# Patient Record
Sex: FEMALE | Race: WHITE | Employment: OTHER | URBAN - METROPOLITAN AREA
[De-identification: names, ages, dates, MRNs, and addresses within clinical notes are randomized per-mention and may not be internally consistent; named-entity substitution may affect disease eponyms.]

---

## 2019-01-17 ENCOUNTER — HOSPITAL ENCOUNTER (OUTPATIENT)
Facility: CLINIC | Age: 84
Setting detail: OBSERVATION
Discharge: HOME | End: 2019-01-19
Attending: NURSE PRACTITIONER | Admitting: INTERNAL MEDICINE
Payer: MEDICARE

## 2019-01-17 DIAGNOSIS — K92.2 GASTROINTESTINAL HEMORRHAGE, UNSPECIFIED GASTROINTESTINAL HEMORRHAGE TYPE: ICD-10-CM

## 2019-01-17 DIAGNOSIS — R55 SYNCOPE: ICD-10-CM

## 2019-01-17 LAB
ABO + RH BLD: NORMAL
ABO + RH BLD: NORMAL
ALBUMIN SERPL-MCNC: 3.2 G/DL (ref 3.4–5)
ALP SERPL-CCNC: 80 U/L (ref 40–150)
ALT SERPL W P-5'-P-CCNC: 18 U/L (ref 0–50)
ANION GAP SERPL CALCULATED.3IONS-SCNC: 9 MMOL/L (ref 3–14)
AST SERPL W P-5'-P-CCNC: 26 U/L (ref 0–45)
BILIRUB SERPL-MCNC: 0.8 MG/DL (ref 0.2–1.3)
BLD GP AB SCN SERPL QL: NORMAL
BLOOD BANK CMNT PATIENT-IMP: NORMAL
BUN SERPL-MCNC: 18 MG/DL (ref 7–30)
CALCIUM SERPL-MCNC: 9.1 MG/DL (ref 8.5–10.1)
CHLORIDE SERPL-SCNC: 105 MMOL/L (ref 94–109)
CO2 SERPL-SCNC: 27 MMOL/L (ref 20–32)
CREAT SERPL-MCNC: 1.1 MG/DL (ref 0.52–1.04)
ERYTHROCYTE [DISTWIDTH] IN BLOOD BY AUTOMATED COUNT: 12.7 % (ref 10–15)
GFR SERPL CREATININE-BSD FRML MDRD: 46 ML/MIN/{1.73_M2}
GLUCOSE SERPL-MCNC: 205 MG/DL (ref 70–99)
HCT VFR BLD AUTO: 40.8 % (ref 35–47)
HEMOCCULT STL QL: POSITIVE
HGB BLD-MCNC: 13.3 G/DL (ref 11.7–15.7)
INTERPRETATION ECG - MUSE: NORMAL
MCH RBC QN AUTO: 32.9 PG (ref 26.5–33)
MCHC RBC AUTO-ENTMCNC: 32.6 G/DL (ref 31.5–36.5)
MCV RBC AUTO: 101 FL (ref 78–100)
PLATELET # BLD AUTO: 316 10E9/L (ref 150–450)
POTASSIUM SERPL-SCNC: 3.4 MMOL/L (ref 3.4–5.3)
PROT SERPL-MCNC: 6.8 G/DL (ref 6.8–8.8)
RBC # BLD AUTO: 4.04 10E12/L (ref 3.8–5.2)
SODIUM SERPL-SCNC: 141 MMOL/L (ref 133–144)
SPECIMEN EXP DATE BLD: NORMAL
TROPONIN I SERPL-MCNC: <0.015 UG/L (ref 0–0.04)
WBC # BLD AUTO: 8.8 10E9/L (ref 4–11)

## 2019-01-17 PROCEDURE — 84484 ASSAY OF TROPONIN QUANT: CPT | Performed by: NURSE PRACTITIONER

## 2019-01-17 PROCEDURE — 36415 COLL VENOUS BLD VENIPUNCTURE: CPT | Performed by: INTERNAL MEDICINE

## 2019-01-17 PROCEDURE — 99285 EMERGENCY DEPT VISIT HI MDM: CPT | Mod: 25

## 2019-01-17 PROCEDURE — 84484 ASSAY OF TROPONIN QUANT: CPT | Mod: 91 | Performed by: INTERNAL MEDICINE

## 2019-01-17 PROCEDURE — 25000128 H RX IP 250 OP 636: Performed by: NURSE PRACTITIONER

## 2019-01-17 PROCEDURE — 85027 COMPLETE CBC AUTOMATED: CPT | Performed by: NURSE PRACTITIONER

## 2019-01-17 PROCEDURE — G0378 HOSPITAL OBSERVATION PER HR: HCPCS

## 2019-01-17 PROCEDURE — 25000128 H RX IP 250 OP 636: Performed by: INTERNAL MEDICINE

## 2019-01-17 PROCEDURE — 93005 ELECTROCARDIOGRAM TRACING: CPT

## 2019-01-17 PROCEDURE — 96361 HYDRATE IV INFUSION ADD-ON: CPT

## 2019-01-17 PROCEDURE — 82272 OCCULT BLD FECES 1-3 TESTS: CPT | Performed by: NURSE PRACTITIONER

## 2019-01-17 PROCEDURE — 80053 COMPREHEN METABOLIC PANEL: CPT | Performed by: NURSE PRACTITIONER

## 2019-01-17 PROCEDURE — 96360 HYDRATION IV INFUSION INIT: CPT

## 2019-01-17 PROCEDURE — 86900 BLOOD TYPING SEROLOGIC ABO: CPT | Performed by: NURSE PRACTITIONER

## 2019-01-17 PROCEDURE — 99219 ZZC INITIAL OBSERVATION CARE,LEVL II: CPT | Performed by: INTERNAL MEDICINE

## 2019-01-17 PROCEDURE — 86901 BLOOD TYPING SEROLOGIC RH(D): CPT | Performed by: NURSE PRACTITIONER

## 2019-01-17 PROCEDURE — 86850 RBC ANTIBODY SCREEN: CPT | Performed by: NURSE PRACTITIONER

## 2019-01-17 RX ORDER — ONDANSETRON 4 MG/1
4 TABLET, ORALLY DISINTEGRATING ORAL EVERY 6 HOURS PRN
Status: DISCONTINUED | OUTPATIENT
Start: 2019-01-17 | End: 2019-01-19 | Stop reason: HOSPADM

## 2019-01-17 RX ORDER — ACETAMINOPHEN 325 MG/1
650 TABLET ORAL EVERY 4 HOURS PRN
Status: DISCONTINUED | OUTPATIENT
Start: 2019-01-17 | End: 2019-01-18 | Stop reason: ALTCHOICE

## 2019-01-17 RX ORDER — SODIUM CHLORIDE 9 MG/ML
INJECTION, SOLUTION INTRAVENOUS CONTINUOUS
Status: DISCONTINUED | OUTPATIENT
Start: 2019-01-17 | End: 2019-01-19

## 2019-01-17 RX ORDER — NALOXONE HYDROCHLORIDE 0.4 MG/ML
.1-.4 INJECTION, SOLUTION INTRAMUSCULAR; INTRAVENOUS; SUBCUTANEOUS
Status: DISCONTINUED | OUTPATIENT
Start: 2019-01-17 | End: 2019-01-19 | Stop reason: HOSPADM

## 2019-01-17 RX ORDER — ONDANSETRON 2 MG/ML
4 INJECTION INTRAMUSCULAR; INTRAVENOUS EVERY 6 HOURS PRN
Status: DISCONTINUED | OUTPATIENT
Start: 2019-01-17 | End: 2019-01-19 | Stop reason: HOSPADM

## 2019-01-17 RX ORDER — ACETAMINOPHEN 650 MG/1
650 SUPPOSITORY RECTAL EVERY 4 HOURS PRN
Status: DISCONTINUED | OUTPATIENT
Start: 2019-01-17 | End: 2019-01-19 | Stop reason: HOSPADM

## 2019-01-17 RX ADMIN — SODIUM CHLORIDE 1000 ML: 9 INJECTION, SOLUTION INTRAVENOUS at 15:31

## 2019-01-17 RX ADMIN — SODIUM CHLORIDE: 9 INJECTION, SOLUTION INTRAVENOUS at 19:46

## 2019-01-17 SDOH — HEALTH STABILITY: MENTAL HEALTH: HOW OFTEN DO YOU HAVE A DRINK CONTAINING ALCOHOL?: NEVER

## 2019-01-17 ASSESSMENT — ENCOUNTER SYMPTOMS
LIGHT-HEADEDNESS: 0
ABDOMINAL PAIN: 0
FEVER: 0
CHILLS: 0
ROS GI COMMENTS: SOFT STOOLS
DYSURIA: 0
SHORTNESS OF BREATH: 0
DIZZINESS: 0

## 2019-01-17 NOTE — ED NOTES
[facility]  ED Nurse Handoff Report    Liseth Daily is a 83 year old female   ED Chief complaint: Loss of Consciousness  . ED Diagnosis:   Final diagnoses:   Syncope   Gastrointestinal hemorrhage, unspecified gastrointestinal hemorrhage type     Allergies:   Allergies   Allergen Reactions     Accupril      Aspirin      Penicillin G        Code Status: Full Code  Activity level - Baseline/Home:  Independent. Activity Level - Current:   Stand with Assist of 2. Lift room needed: No. Bariatric: No   Needed: No   Isolation: No. Infection: Not Applicable.     Vital Signs:   Vitals:    01/17/19 1430 01/17/19 1445 01/17/19 1500 01/17/19 1515   BP: 114/61 107/58 97/56 104/53   Pulse: 60  63 65   Resp: 17 (!) 41 17 20   Temp:       TempSrc:       SpO2: 97%  100% 99%       Cardiac Rhythm:  ,      Pain level:    Patient confused: No. Patient Falls Risk: Yes.   Elimination Status: Has voided   Patient Report - Initial Complaint: Syncope. Focused Assessment:   14:28 Neurological Cognitive - Level Of Consciousness: alert Arousal Level: opens eyes spontaneously Orientation: oriented x 4 Follows Commands: yes Speech: clear; spontaneous (slow to answer occasionally but states this is her baseline d/t prior CVAs) Mood/Behavior: cooperative  Pupils (CN II) - Pupil PERRLA: yes  Motor Strength - Left Upper: 5 - active movement against gravity and full resistance Right Upper: 5 - active movement against gravity and full resistance Left Lower: 5 - active movement against gravity and full resistance Right Lower: 5 - active movement against gravity and full resistance ER      14:27 Gastrointestinal Gastrointestinal - Nausea/Vomiting Signs/Symptoms: nausea intermittent Stool Color: red streaked Gastrointestinal Comment: incontinent of stool PTA, Had BM in [facility] whicg was red streaked. Foul smelling ER     14:27 Skin Color/Condition Skin - Skin Color/Characteristics: pale ER        Tests Performed: Labs. Abnormal  Results:   Labs Ordered and Resulted from Time of ED Arrival Up to the Time of Departure from the ED   CBC WITH PLATELETS - Abnormal; Notable for the following components:       Result Value     (*)     All other components within normal limits   COMPREHENSIVE METABOLIC PANEL - Abnormal; Notable for the following components:    Glucose 205 (*)     Creatinine 1.10 (*)     GFR Estimate 46 (*)     GFR Estimate If Black 54 (*)     Albumin 3.2 (*)     All other components within normal limits   OCCULT BLOOD STOOL - Abnormal; Notable for the following components:    Occult Blood Positive (*)     All other components within normal limits   TROPONIN I   MAY SALINE LOCK IV   ABO/RH TYPE AND SCREEN   .   Treatments provided: IVF  Family Comments: son present  OBS brochure/video discussed/provided to patient:  No  ED Medications:   Medications   0.9% sodium chloride BOLUS (1,000 mLs Intravenous New Bag 1/17/19 0393)     Drips infusing:  No  For the majority of the shift, the patient's behavior Green. Interventions performed were NA.     Severe Sepsis OR Septic Shock Diagnosis Present: No      ED Nurse Name/Phone Number: Lilo Bethea,   4:14 PM    RECEIVING UNIT ED HANDOFF REVIEW    Above ED Nurse Handoff Report was reviewed: Yes  Reviewed by: Gloria Ragsdale on January 17, 2019 at 5:51 PM

## 2019-01-17 NOTE — ED NOTES
Pt guest arrived and stated he never received PT's drivers license from EMS.  Called and left voicemail with [facility]

## 2019-01-17 NOTE — ED NOTES
Observation Brochure and Video   Patient informed of observation status based on provider's order. Observation Brochure was given and video watched.  Lilo Bethea RN

## 2019-01-17 NOTE — ED NOTES
Bed: ED20  Expected date: 1/17/19  Expected time: 1:44 PM  Means of arrival: Ambulance  Comments:  [facility]

## 2019-01-17 NOTE — ED PROVIDER NOTES
History     Chief Complaint:  Loss of Consciousness      HPI   Liseth Daily is a 83 year old female with a past medical history significant for GI bleed and cerebral infarction who presents with for evaluation of loss of consciousness. The patient reports that she has had 8 syncopal episodes in the last 5 years. Her most recent episode occurred while she was going to the bathroom several weeks ago and passed out. She denies abdominal pain, lightheadedness, dizziness, blood thinners. She has noticed that her stool has been darker than normal.        Allergies:  Accupril  Aspirin  Penicillin G     Medications:    The patient is not currently taking any prescribed medications.    Past Medical History:    Cerebral infarct  GI bleed    Past Surgical History:    Cholecystectomy  Orthopedic surgery     Family History:    History reviewed. No pertinent family history.     Social History:  Smoking status: Never smoker  Alcohol use: no  Marital Status:  Single       Review of Systems   Constitutional: Negative for chills and fever.   Respiratory: Negative for shortness of breath.    Cardiovascular: Negative for chest pain.   Gastrointestinal: Negative for abdominal pain.        Soft stools   Genitourinary: Negative for dysuria.   Neurological: Positive for syncope. Negative for dizziness and light-headedness.   All other systems reviewed and are negative.      Physical Exam     Patient Vitals for the past 24 hrs:   BP Temp Temp src Pulse Heart Rate Resp SpO2   01/17/19 1645 119/64 -- -- 71 71 12 93 %   01/17/19 1630 112/62 -- -- 68 66 29 92 %   01/17/19 1615 103/49 -- -- 63 66 19 94 %   01/17/19 1600 113/64 -- -- 67 68 (!) 33 92 %   01/17/19 1530 102/74 -- -- 61 64 20 94 %   01/17/19 1515 104/53 -- -- 65 64 20 99 %   01/17/19 1500 97/56 -- -- 63 66 17 100 %   01/17/19 1445 107/58 -- -- -- 70 (!) 41 --   01/17/19 1430 114/61 -- -- 60 61 17 97 %   01/17/19 1415 107/61 -- -- 56 61 13 96 %   01/17/19 1408 117/68 97.7  F  (36.5  C) Oral -- 60 18 98 %         Physical Exam  General: Alert, Mild  discomfort, well kept  Eyes: PERRL, conjunctivae pink no scleral icterus or conjunctival injection  ENT:   Moist mucus membranes, posterior oropharynx clear without erythema or exudates, No lymphadenopathy, Normal voice  Resp:  Lungs clear to auscultation bilaterally, no crackles/rubs/wheezes. Good air movement  CV:  Normal rate and rhythm, no murmurs/rubs/gallops  GI:  Abdomen soft and non-distended.  Hyperactive bowel sounds.  No tenderness, guarding or rebound, No masses  Skin:  Warm, dry.  No rashes or petechiae  Musculoskeletal: No peripheral edema or calf tenderness, Normal gross ROM   Neuro: Alert and oriented to person/place/time, normal sensation  Psychiatric: Normal affect, cooperative, good eye contact  Rectal: Normal tone, no hemorrhoids, soft light brown stool in vault, reevaluation shows red stool     Emergency Department Course   ECG (15:06:51):  Rate 64 bpm. IL interval *. QRS duration 70. QT/QTc 416/429. P-R-T axes * 15 -16. Junctional rhythm, Nonspecific T wave abnormality, Abnormal ECG,  Interpreted at 1506 by Ziggy Darling APRN.      Laboratory:  Stool: Occult blood positive  ABO/Rh type screen: O positive  Troponin I: <0.015  CMP: Glucose 205, Creatinine 1.10, GFR 46, Albumin 3.2  CBC: WNL (WBC 8.8, HGB 13.3, )    Interventions:  1531: NaCl BOLUS 1000 ml IV    Emergency Department Course:  Past medical records, nursing notes, and vitals reviewed.  1417: I performed an exam of the patient and obtained history, as documented above.     IV inserted and blood drawn.    Findings and plan explained to the Patient who consents to admission.     1623: Discussed the patient with Dr. Ferrara, who will admit the patient to a med bed for further monitoring, evaluation, and treatment.       Impression & Plan      Medical Decision Making:  Liseth Daily is a 83 year old female who presents today for evaluation of  syncopal episode.  She was sitting at lunch when she had a syncopal episode at that time she did also lose control of her bowels.  She reports loose stools and that she has had multiple vasovagal episodes particularly with straining at the restroom over the last couple of years.  She denies any recent blood in her stool however does report GI bleed several years ago.  Her evaluation today shows a normal hemoglobin and stable vital signs.  She did have 3 stools in the department.  The third stool did show blood and was in fact positive with the Hemoccult.  At this point in time due to syncope plan will be to put patient in observation unit trend hemoglobin and consult GI.  I spoke to the hospitalist who will accept patient to his service.      Diagnosis:    ICD-10-CM    1. Syncope R55    2. Gastrointestinal hemorrhage, unspecified gastrointestinal hemorrhage type K92.2        Disposition:  Admitted to Med bed      Zack Durand  1/17/2019   [facility] EMERGENCY DEPARTMENT    Scribe Disclosure:  I, Zack Durand, am serving as a scribe at 2:17 PM on 1/17/2019 to document services personally performed by Ziggy Darling APRN based on my observations and the provider's statements to me.          Ziggy Darling APRN CNP  01/17/19 3615

## 2019-01-17 NOTE — ED TRIAGE NOTES
Patient brought in by EMS for eval of syncopal episode while at lunch. Patient was incontinent of bowel with episode.

## 2019-01-18 LAB
ERYTHROCYTE [DISTWIDTH] IN BLOOD BY AUTOMATED COUNT: 12.7 % (ref 10–15)
GLUCOSE BLDC GLUCOMTR-MCNC: 106 MG/DL (ref 70–99)
GLUCOSE BLDC GLUCOMTR-MCNC: 95 MG/DL (ref 70–99)
HCT VFR BLD AUTO: 36.7 % (ref 35–47)
HGB BLD-MCNC: 12.1 G/DL (ref 11.7–15.7)
HGB BLD-MCNC: 12.5 G/DL (ref 11.7–15.7)
MCH RBC QN AUTO: 33.2 PG (ref 26.5–33)
MCHC RBC AUTO-ENTMCNC: 33 G/DL (ref 31.5–36.5)
MCV RBC AUTO: 101 FL (ref 78–100)
PLATELET # BLD AUTO: 225 10E9/L (ref 150–450)
RBC # BLD AUTO: 3.64 10E12/L (ref 3.8–5.2)
TROPONIN I SERPL-MCNC: <0.015 UG/L (ref 0–0.04)
TROPONIN I SERPL-MCNC: <0.015 UG/L (ref 0–0.04)
WBC # BLD AUTO: 12.7 10E9/L (ref 4–11)

## 2019-01-18 PROCEDURE — 00000146 ZZHCL STATISTIC GLUCOSE BY METER IP

## 2019-01-18 PROCEDURE — 99225 ZZC SUBSEQUENT OBSERVATION CARE,LEVEL II: CPT | Performed by: INTERNAL MEDICINE

## 2019-01-18 PROCEDURE — 85018 HEMOGLOBIN: CPT | Mod: 91 | Performed by: INTERNAL MEDICINE

## 2019-01-18 PROCEDURE — 36415 COLL VENOUS BLD VENIPUNCTURE: CPT | Performed by: INTERNAL MEDICINE

## 2019-01-18 PROCEDURE — 99207 ZZC CDG-CODE CATEGORY CHANGED: CPT | Performed by: INTERNAL MEDICINE

## 2019-01-18 PROCEDURE — A9270 NON-COVERED ITEM OR SERVICE: HCPCS | Mod: GY | Performed by: INTERNAL MEDICINE

## 2019-01-18 PROCEDURE — 25000128 H RX IP 250 OP 636: Performed by: INTERNAL MEDICINE

## 2019-01-18 PROCEDURE — 96361 HYDRATE IV INFUSION ADD-ON: CPT

## 2019-01-18 PROCEDURE — G0378 HOSPITAL OBSERVATION PER HR: HCPCS

## 2019-01-18 PROCEDURE — 84484 ASSAY OF TROPONIN QUANT: CPT | Performed by: INTERNAL MEDICINE

## 2019-01-18 PROCEDURE — 85027 COMPLETE CBC AUTOMATED: CPT | Performed by: INTERNAL MEDICINE

## 2019-01-18 PROCEDURE — 25000132 ZZH RX MED GY IP 250 OP 250 PS 637: Mod: GY | Performed by: INTERNAL MEDICINE

## 2019-01-18 RX ORDER — ACETAMINOPHEN 500 MG
500 TABLET ORAL EVERY 6 HOURS PRN
Status: DISCONTINUED | OUTPATIENT
Start: 2019-01-18 | End: 2019-01-19 | Stop reason: HOSPADM

## 2019-01-18 RX ORDER — ACETAMINOPHEN 500 MG
500 TABLET ORAL EVERY 6 HOURS PRN
COMMUNITY

## 2019-01-18 RX ORDER — MICONAZOLE NITRATE 20 MG/G
CREAM TOPICAL DAILY PRN
COMMUNITY

## 2019-01-18 RX ORDER — LOSARTAN POTASSIUM 50 MG/1
50 TABLET ORAL DAILY
COMMUNITY

## 2019-01-18 RX ORDER — LOSARTAN POTASSIUM 50 MG/1
50 TABLET ORAL DAILY
Status: DISCONTINUED | OUTPATIENT
Start: 2019-01-18 | End: 2019-01-19 | Stop reason: HOSPADM

## 2019-01-18 RX ORDER — AMLODIPINE BESYLATE 5 MG/1
5 TABLET ORAL DAILY
COMMUNITY

## 2019-01-18 RX ORDER — AMLODIPINE BESYLATE 5 MG/1
5 TABLET ORAL DAILY
Status: DISCONTINUED | OUTPATIENT
Start: 2019-01-18 | End: 2019-01-19 | Stop reason: HOSPADM

## 2019-01-18 RX ADMIN — RANITIDINE 150 MG: 150 TABLET ORAL at 20:24

## 2019-01-18 RX ADMIN — SODIUM CHLORIDE: 9 INJECTION, SOLUTION INTRAVENOUS at 08:16

## 2019-01-18 RX ADMIN — LOSARTAN POTASSIUM 50 MG: 50 TABLET, FILM COATED ORAL at 20:22

## 2019-01-18 RX ADMIN — ACETAMINOPHEN 650 MG: 325 TABLET, FILM COATED ORAL at 12:51

## 2019-01-18 RX ADMIN — SODIUM CHLORIDE 75 ML/HR: 9 INJECTION, SOLUTION INTRAVENOUS at 20:24

## 2019-01-18 RX ADMIN — AMLODIPINE BESYLATE 5 MG: 5 TABLET ORAL at 20:22

## 2019-01-18 ASSESSMENT — MIFFLIN-ST. JEOR: SCORE: 1069.6

## 2019-01-18 NOTE — H&P
[facility]  Hospitalist Admission Note  Name: Liseth Daily    MRN: [id number]  YOB: 1935    Age: 83 year old  Date of admission: 1/17/2019  Primary care provider: Davina Frausto            Assessment and Plan:   Liseth Daily is a 83 year old female with known prior history of CVA, not on any daily medications, lives independently at home, visiting from [place], [place] and accompanying her nephew earlier and presented in the emergency room due to an apparent near syncopal event in the restaurant restroom earlier today    1.  Near syncope while straining, earlier feeling of constipation.  Occurred during attempted bowel movement  2.  Positive stool occult with normal hemoglobin  3.  Prior history of CVA  3.  Hyperglycemia, blood sugar of 203 in metabolic panel    [place] under observation.  IV fluid support.  Recheck blood sugar.  Send A1c as well.  Patient has no known prior history of diabetes.  Not on any medications for diabetes as well.  Orthostatic blood pressure levels please.  Do not see any urgent indication for further testing regarding this positive stool occult.  She had a numerous colonoscopies in the past, with known prior hemorrhoids and currently has normal hemoglobin levels.  Continue with telemetry monitoring for now given apparent near syncope but likely a vasovagal mechanics given the clinical circumstances.      Prophylaxis: Ambulate, PCD's if staying mostly in bed  Disposition: Likely home in 1 day          Chief Complaint:   Near syncope       Source of Information:   Patient with good reliability  Discussion with ED physician  Review of E chart records         History of Present Illness:   Liseth Daily is a 83 year old female with known prior history of CVA, not on any daily medications, lives independently at home, visiting from [place], [place] and accompanying her nephew earlier and presented in the emergency room due to an apparent near syncopal event in  the restaurant restroom earlier today.  Patient stated that she felt dizzy, lightheadedness, diaphoretic and felt a near passing out spell while she was sitting on the toilet while trying to strain.  She stated that she did not loss consciousness, no reported head trauma, denies any chest pain, shortness of breath, nausea, vomiting, diarrhea, or known obvious bleeding tendencies.   She reassured me earlier that this is likely her seventh or eighth time of similar presentation of near syncope .  She was previously seen in workup at [facility] given recurrence of these syncope and near syncopal event.  It was felt that likely a vasovagal event in the past given the circumstances of almost always happening while she is straining for bowel movement given her prior history of constipation.   She was crying her best earlier not to strain but she felt some abdominal cramps that led for her to strain more and eventually had to strain more that led to this near syncope.    She denies any nausea, vomiting, fever, chills, dysuria, frequency, back pain or mental status changes.    Upon presentation her hemodynamics remained stable, no significant hypoxia, no overt GI bleeding symptoms but further investigation.edema showed positive stool occult, and earlier reported that she had syncopal event but further description of the patient appears to be only a near syncope.  We were requested for further management care given the setting of apparent syncope, concerns for GI bleeding.  However complete blood count showed normal hemoglobin.  Circumstances appears to be most likely vasovagal event as well.  Patient also has a history of hemorrhoids.        Past Medical History:     Past Medical History:   Diagnosis Date     Cerebral infarction (H)      GI bleed              Past Surgical History:     Past Surgical History:   Procedure Laterality Date     CHOLECYSTECTOMY       ORTHOPEDIC SURGERY               Social History:     Social  History     Tobacco Use     Smoking status: Never Smoker   Substance Use Topics     Alcohol use: No     Frequency: Never             Family History:   Family history was fully reviewed and non-contributory in this case.         Allergies:     Allergies   Allergen Reactions     Accupril      Aspirin      Penicillin G              Medications:     Prior to Admission medications    Not on File             Review of Systems:   A Comprehensive greater than 10 system review of systems was carried out.  Pertinent positives and negatives are noted above.  Otherwise negative for contributory information.           Physical Exam:   Blood pressure 130/55, pulse 71, temperature 97.6  F (36.4  C), temperature source Oral, resp. rate 12, SpO2 94 %.  Wt Readings from Last 1 Encounters:   No data found for Wt     Exam:  GENERAL: No apparent distress. Awake, alert, and fully oriented.  HEENT: Normocephalic, atraumatic. Extraocular movements intact.  CARDIOVASCULAR: Regular rate and rhythm without murmurs or rubs. No JVD  PULMONARY: Clear to auscultation, no wheezes, crackles  ABDOMINAL: Soft, non-tender, non-distended. Bowel sounds normoactive. No hepatosplenomegaly.  EXTREMITIES: No cyanosis or clubbing. No edema.  NEUROLOGICAL: CN 2-12 grossly intact, awake and alert x3, spontaneous and coherent speech. no focal neurological deficits.  DERMATOLOGICAL: No rash, ulcer, ecchymoses, jaundice.  Psych: not agitation, not combative, pleasant mood           Data:   EKG: Multiple artifacts, NSR at 64 bpm, nonspecific ST changes    Imaging:  No results found for this or any previous visit.    Labs:  No results for input(s): CULT in the last 168 hours.  Recent Labs   Lab 01/17/19  1452   WBC 8.8   HGB 13.3   HCT 40.8   *        Recent Labs   Lab 01/17/19  1452      POTASSIUM 3.4   CHLORIDE 105   CO2 27   ANIONGAP 9   *   BUN 18   CR 1.10*   GFRESTIMATED 46*   GFRESTBLACK 54*   BHUMI 9.1   PROTTOTAL 6.8   ALBUMIN 3.2*    BILITOTAL 0.8   ALKPHOS 80   AST 26   ALT 18     No results for input(s): SED, CRP in the last 168 hours.  Recent Labs   Lab 01/17/19  1452   *     No results for input(s): INR in the last 168 hours.  Recent Labs   Lab 01/17/19  1452   TROPI <0.015

## 2019-01-18 NOTE — PLAN OF CARE
PRIMARY DIAGNOSIS: SYNCOPE/TIA  OUTPATIENT/OBSERVATION GOALS TO BE MET BEFORE DISCHARGE:  1. Orthostatic performed: Yes:          Lying Orthostatic BP: 116/53         Sitting Orthostatic BP: 126/66(a little dizziness reported)         Standing Orthostatic BP: 135/57     2. Diagnostic testing complete & at baseline neurologic testing: Yes    3. Cleared by consultants (if involved): N/A    4. Interpretation of cardiac rhythm per telemetry tech: SR with PACs    5. Tolerating adequate PO diet and medications: Yes    6. Return to near baseline physical activity or neurologic status: Ax1 with GB and Walker     Discharge Planner Nurse   Safe discharge environment identified: Yes  Barriers to discharge: Yes       Entered by: Linda Hector 01/18/2019 1:12 AM     Please review provider order for any additional goals.   Nurse to notify provider when observation goals have been met and patient is ready for discharge.    VSS. Afebrile. Denies pain. Up with Ax1KEITH Walker. Denies dizziness. Resting comfortably. Will continue to monitor.

## 2019-01-18 NOTE — PLAN OF CARE
"PRIMARY DIAGNOSIS: GI BLEED    OUTPATIENT/OBSERVATION GOALS TO BE MET BEFORE DISCHARGE  Orthostatic performed: No - Performed 1/17    Stable Hgb Yes.   Recent Labs   Lab Test 01/18/19  0607 01/17/19  1452   HGB 12.1 13.3         Resolved or declined bleeding episodes: No,  with a moderate amount of bleeding Last episode: Around 8 AM this AM.    Appropriate testing complete: N/A    Cleared for discharge by consultants (if involved): No -GI    Safe discharge environment identified: Yes    Continues with lower abdominal cramping. \"Just a nuisance.\" Denies nausea. Mild/moderate bowel movement this AM with clots/bright red blood per patient/nursing assistant. Will continue to monitor stools. Has denies dizziness/shortness of breath with activity. Plan for hemoglobin re-check at 1800 today and GI consult. Patient updated on plan of care.     Discharge Planner Nurse   Safe discharge environment identified: Yes  Barriers to discharge: No       Entered by: Elizabeth Pascual 01/18/2019 12:06 PM     Please review provider order for any additional goals.   Nurse to notify provider when observation goals have been met and patient is ready for discharge.  "

## 2019-01-18 NOTE — PLAN OF CARE
PRIMARY DIAGNOSIS: SYNCOPE/TIA  OUTPATIENT/OBSERVATION GOALS TO BE MET BEFORE DISCHARGE:  1. Orthostatic performed: Yes:          Lying Orthostatic BP: 116/53         Sitting Orthostatic BP: 126/66(a little dizziness reported)         Standing Orthostatic BP: 135/57     2. Diagnostic testing complete & at baseline neurologic testing: Yes    3. Cleared by consultants (if involved): N/A    4. Interpretation of cardiac rhythm per telemetry tech: SR with PACs    5. Tolerating adequate PO diet and medications: Yes    6. Return to near baseline physical activity or neurologic status: Ax1 with GB and Walker     Discharge Planner Nurse   Safe discharge environment identified: Yes  Barriers to discharge: Yes       Entered by: Linda Hector 01/18/2019 5:01 AM     Please review provider order for any additional goals.   Nurse to notify provider when observation goals have been met and patient is ready for discharge.    VSS. Temp Max 99.3. Denies pain. Up with Ax1, GB Walker. Denies dizziness. Resting comfortably. Will continue to monitor.

## 2019-01-18 NOTE — PLAN OF CARE
PRIMARY DIAGNOSIS: SYNCOPE  OUTPATIENT/OBSERVATION GOALS TO BE MET BEFORE DISCHARGE:  1. Orthostatic performed: Yes, negative    2. Diagnostic testing complete & at baseline neurologic testing: Yes    3. Cleared by consultants (if involved): N/A    4. Interpretation of cardiac rhythm per telemetry tech: Awaiting telebox    5. Tolerating adequate PO diet and medications: No, patient not wanting to eat yet. On a regular diet.    6. Return to near baseline physical activity or neurologic status: No, mobility unaware yet - patient feels weaker than baseline.     Discharge Planner Nurse   Safe discharge environment identified: Yes  Barriers to discharge: No    Patient is alert and oriented x4, has cramping pain in abdomen - rates a 3/10. Denies nausea, does not want to eat right now. Patient feels weak. Bed alarm on, patient instructed to call for assistance. Plan for the evening is telemetry, trops at midnight and 6 AM, AM labs. Patient is tired and resting currently. Per MD, will monitor diarrhea but no need to test. Will continue to monitor and assess.      Please review provider order for any additional goals.   Nurse to notify provider when observation goals have been met and patient is ready for discharge.

## 2019-01-18 NOTE — PLAN OF CARE
PRIMARY DIAGNOSIS: SYNCOPE  OUTPATIENT/OBSERVATION GOALS TO BE MET BEFORE DISCHARGE:  1. Orthostatic performed: No (done 1/18)    2. Diagnostic testing complete & at baseline neurologic testing: N/A    3. Cleared by consultants (if involved): N/A    4. Interpretation of cardiac rhythm per telemetry tech: NSR, rate 78    5. Tolerating adequate PO diet and medications: Yes    6. Return to near baseline physical activity or neurologic status: Yes    Vitals stable. Afebrile. Reports mild lower abdominal cramping which is not new for her. Denies nausea or dizziness. Tolerating clear liquids, but does not want to eat breakfast at this time. No bowel movement since admission. Normal saline infusing at 75 mL/hour. WBC increased today (12.7 when previously 8.8).     Discharge Planner Nurse   Safe discharge environment identified: Yes  Barriers to discharge: No       Entered by: Elizabeth Pascual 01/18/2019      Please review provider order for any additional goals.   Nurse to notify provider when observation goals have been met and patient is ready for discharge.

## 2019-01-18 NOTE — PROGRESS NOTES
[facility]    Hospitalist Progress Note    Date of Service (when I saw the patient): 01/18/2019    Assessment & Plan   Liseth Daily is a 83 year old female who was admitted on 1/17/2019.   PMH of CVA, not on any daily medications admitted after apparent near syncopal event in the restaurant restroom earlier today. No LOC.  Previous history of syncopal event secondary to vasovagal episodes.  Mostly happening in the setting of straining for bowel movement.  Emergency department stools positive for occult blood.  On the morning of January 18 she had bowel movement with red mare blood.  GI consulted.  Hemoglobin stable.  Vitals stable.  History of hemorrhoids.    1.  Near syncope while straining:  Likely vasovagal. EKG unremarkable and trops neg.  --Continue telemetry  -- Check orthostatics  --OT/PT consult      2.   Bright red blood per rectum:  All cold blood positive in emergency department.  Episode of bright red blood per rectum this morning.  Prior colonoscopies were unremarkable.  History of hemorrhoids.  Vitals are stable.  Hemoglobin appears stable  --GI consult  --Hemoglobin every 12 hours    3.  Prior history of CVA:  Plan to continue to monitor.  She was not on aspirin prior to admission.    3.  Hyperglycemia, blood sugar of 203 in metabolic panel.  No known history of diabetes.  Plan to check A1c  Blood sugar check twice a day    DVT Prophylaxis: Low Risk/Ambulatory with no VTE prophylaxis indicated  Code Status: No Order    Disposition: Expected discharge in 1-2 days once cleared by GI and Hgb stable.    Karen Romero MD    Interval History   Episode of bright blood per rectum this morning.  Denies chest pain, shortness of breath, dizziness or palpitations.    -Data reviewed today: I reviewed all new labs and imaging results over the last 24 hours. I personally reviewed no images or EKG's today.    Physical Exam   Temp: 98.9  F (37.2  C) Temp src: Oral BP: 140/66 Pulse: 71 Heart  Rate: 72 Resp: 16 SpO2: 94 % O2 Device: None (Room air)    There were no vitals filed for this visit.  Vital Signs with Ranges  Temp:  [97.6  F (36.4  C)-99.3  F (37.4  C)] 98.9  F (37.2  C)  Pulse:  [61-71] 71  Heart Rate:  [64-78] 72  Resp:  [12-33] 16  BP: ()/(49-74) 140/66  SpO2:  [92 %-100 %] 94 %  No intake/output data recorded.    GENERAL:  Well-appearing.    HEENT: Mucous membranes moist.    NECK:  No thyromegaly or lymphadenopathy.    CARDIOVASCULAR:  Tachycardic.  No murmurs.    RESPIRATORY: clear to auscultation.  Maintaining oxygen saturation on room air.  No increased work of breathing.    ABDOMEN:  Soft, nontender, nondistended.  Normoactive bowel sounds.    EXTREMITIES:  Distal pulses intact in lower extremities bilaterally.  No pedal edema.    SKIN:  Warm, dry.    NEUROLOGIC:  Alert and oriented x3.    PSYCHIATRIC:  Calm, cooperative.      Medications     sodium chloride 75 mL/hr at 01/18/19 0816         Data   Recent Labs   Lab 01/18/19  0607 01/17/19  2349 01/17/19  1452   WBC 12.7*  --  8.8   HGB 12.1  --  13.3   *  --  101*     --  316   NA  --   --  141   POTASSIUM  --   --  3.4   CHLORIDE  --   --  105   CO2  --   --  27   BUN  --   --  18   CR  --   --  1.10*   ANIONGAP  --   --  9   BHUMI  --   --  9.1   GLC  --   --  205*   ALBUMIN  --   --  3.2*   PROTTOTAL  --   --  6.8   BILITOTAL  --   --  0.8   ALKPHOS  --   --  80   ALT  --   --  18   AST  --   --  26   TROPI <0.015 <0.015 <0.015       No results found for this or any previous visit (from the past 24 hour(s)).    Karen Romero

## 2019-01-18 NOTE — PLAN OF CARE
PRIMARY DIAGNOSIS: GI BLEED    OUTPATIENT/OBSERVATION GOALS TO BE MET BEFORE DISCHARGE  1. Orthostatic performed: No    2. Stable Hgb Yes.   Recent Labs   Lab Test 01/18/19  0607 01/17/19  1452   HGB 12.1 13.3         3. Resolved or declined bleeding episodes: Yes Last episode: Around 1600, scant amount of bright red blood/mucouse    4. Appropriate testing complete: N/A    5. Cleared for discharge by consultants (if involved): Yes    6. Safe discharge environment identified: Yes    Vital signs stable. Tylenol effective for headache. Denies nausea. Reports very mild dizziness with activity, but steady on feet with ambulation. IVF infusing. Total of about 3-4 bloody stools since 0700 today. Plan to recheck hemoglobin at 1800.     Discharge Planner Nurse   Safe discharge environment identified: Yes  Barriers to discharge: No       Entered by: Elizabeth Pascual 01/18/2019      Please review provider order for any additional goals.   Nurse to notify provider when observation goals have been met and patient is ready for discharge.

## 2019-01-18 NOTE — PLAN OF CARE
ROOM # 225    Living Situation (if not independent, order SW consult): Ind in a house with adult nephew  Facility name:  : [facility], [phone]    Activity level at baseline: Ind with walker  Activity level on admit:Unknown at this time, will try assist of 2 with walker.      Patient registered to observation; given Patient Bill of Rights; given the opportunity to ask questions about observation status and their plan of care.  Patient has been oriented to the observation room, bathroom and call light is in place.    Discussed discharge goals and expectations with patient/family.

## 2019-01-18 NOTE — PHARMACY-ADMISSION MEDICATION HISTORY
Admission medication history interview status for this patient is complete. See [facility] admission navigator for allergy information, prior to admission medications and immunization status.     Medication history interview source(s):Patient, Nephew (Amador), [facility] ([place])  Medication history resources (including written lists, pill bottles, clinic record):None  Primary pharmacy: [facility] in [place], [place]    Changes made to PTA medication list:  Added: Tylenol, Vitamin D, Miconazole, losartan, amlodipine  Deleted: None  Changed: ranitidine (as needed)    Actions taken by pharmacist (provider contacted, etc): Called patient's nephew, [facility] and [facility] in [place] to obtain additional medication information.    Additional medication history information: Patient is a poor historian concerning her medication regimen. Patient was able to recall her ranitidine, Tylenol, Vitamin D, and fungal cream. Called pharmacy and spoke with pharmacist, Raulito, who told me patient has also recently filled amlodipine 5mg and losartan 50mg. Patient is also prescribed Buspar 5mg BID but has not filled this medication in a few months. Patient finished a Terbinafine 250mg regimen about 2 weeks ago per pharmacist.    Medication reconciliation/reorder completed by provider prior to medication history? No    Do you take OTC medications (eg tylenol, ibuprofen, fish oil, eye/ear drops, etc)? Y, see list.    For patients on insulin therapy: N     Prior to Admission medications    Medication Sig Last Dose Taking? Auth Provider   acetaminophen (TYLENOL) 500 MG tablet Take 500 mg by mouth every 6 hours as needed for mild pain  1/17/2019 at Unknown time Yes Reported, Patient   amLODIPine (NORVASC) 5 MG tablet Take 5 mg by mouth daily  Yes Reported, Patient   Cholecalciferol (VITAMIN D PO) Takes 4000 units by mouth every day. Past Month at Unknown time Yes Reported, Patient   losartan (COZAAR) 50 MG tablet Take 50 mg by  mouth daily  Yes Reported, Patient   miconazole (MICATIN) 2 % external cream Apply topically daily as needed Past Week at Unknown time Yes Reported, Patient   ranitidine (ZANTAC) 150 MG tablet Take 150 mg by mouth 2 times daily unknown Yes Unknown, Entered By History

## 2019-01-19 VITALS
TEMPERATURE: 97.8 F | RESPIRATION RATE: 16 BRPM | HEART RATE: 71 BPM | HEIGHT: 62 IN | SYSTOLIC BLOOD PRESSURE: 123 MMHG | OXYGEN SATURATION: 93 % | BODY MASS INDEX: 26.63 KG/M2 | WEIGHT: 144.7 LBS | DIASTOLIC BLOOD PRESSURE: 59 MMHG

## 2019-01-19 LAB — GLUCOSE BLDC GLUCOMTR-MCNC: 92 MG/DL (ref 70–99)

## 2019-01-19 PROCEDURE — 96361 HYDRATE IV INFUSION ADD-ON: CPT

## 2019-01-19 PROCEDURE — G0378 HOSPITAL OBSERVATION PER HR: HCPCS

## 2019-01-19 PROCEDURE — 99217 ZZC OBSERVATION CARE DISCHARGE: CPT | Performed by: HOSPITALIST

## 2019-01-19 PROCEDURE — 00000146 ZZHCL STATISTIC GLUCOSE BY METER IP

## 2019-01-19 NOTE — PROGRESS NOTES
"PRIMARY DIAGNOSIS: GI BLEED     OUTPATIENT/OBSERVATION GOALS TO BE MET BEFORE DISCHARGE  1. Orthostatic performed: No     2. Stable Hgb Yes.        Recent Labs   Lab Test 1/17/2019  1452   01/18/19  0607 01/18/2019  1805   HGB  13.3 12.1 12.5            3. Resolved or declined bleeding episodes: Yes Last episode:  2025, scant amount of blood appearing on disposable undergarment     4. Appropriate testing complete: N/A     5. Cleared for discharge by consultants (if involved): Yes, GI consult completed.  OK to discharge if  Hgb is stable     6. Safe discharge environment identified: Yes    Blood pressure 140/66, pulse 71, temperature 98.9  F (37.2  C), temperature source Oral, resp. rate 16, height 1.575 m (5' 2\"), weight 65.6 kg (144 lb 11.2 oz), SpO2 94 %.  VSS.  Lung sounds clear, adequate sats on room air.  Maintenance IV continues at 75 ccs/hour.  Telemetry per tele tech:  SR, no ectopy HR 70's.  Patient noted to be resting comfortably.  Plan:  Continue to provide supportive cares.          Discharge Planner Nurse   Safe discharge environment identified: Yes  Barriers to discharge: Yes, unless medically cleared         Entered by:  Karen Lesch, 1/18/2019 21:00 pm    Please review provider order for any additional goals.   Nurse to notify provider when observation goals have been met and patient is ready for discharge        "

## 2019-01-19 NOTE — PLAN OF CARE
Patient's After Visit Summary was reviewed with patient.  Patient verbalized understanding of After Visit Summary, recommended follow up and was given an opportunity to ask questions.   Discharge medications sent home with patient/family: Not applicable   Discharged with other: nephew.    OBSERVATION patient END time: 10:15 AM

## 2019-01-19 NOTE — PLAN OF CARE
"PRIMARY DIAGNOSIS: SYNCOPE  OUTPATIENT/OBSERVATION GOALS TO BE MET BEFORE DISCHARGE:  1. Orthostatic performed: Yes:          Lying Orthostatic BP: 116/53         Sitting Orthostatic BP: 126/66(a little dizziness reported)         Standing Orthostatic BP: 135/57     2. Diagnostic testing complete & at baseline neurologic testing: Yes    3. Cleared by consultants (if involved): No    4. Interpretation of cardiac rhythm per telemetry tech: SR HR 70s    5. Tolerating adequate PO diet and medications: Yes    6. Return to near baseline physical activity or neurologic status: Yes, SBA    Discharge Planner Nurse   Safe discharge environment identified: Yes  Barriers to discharge: Yes       Entered by: An Richard 01/19/2019 12:28 AM     Please review provider order for any additional goals.   Nurse to notify provider when observation goals have been met and patient is ready for discharge.    Temp: 99  F (37.2  C) Temp src: Oral BP: 135/64   Heart Rate: 74 Resp: 18 SpO2: 96 %  Pt A&Ox4, currently denying pain. She ambulated to the bathroom SBA, denied dizziness or lightheadedness when ambulating, denies further syncopal episodes after using the bathroom, states she feels \"back to normal.\" She did not have a BM, no blood was assessed on brief or toilet tissue. Occult blood was positive, hemoglobin has been stable, last checked was 12.5, refer to gastroenterology note.  "

## 2019-01-19 NOTE — PLAN OF CARE
PRIMARY DIAGNOSIS: SYNCOPE  OUTPATIENT/OBSERVATION GOALS TO BE MET BEFORE DISCHARGE:  1. Orthostatic performed: Yes:          Lying Orthostatic BP: 116/53         Sitting Orthostatic BP: 126/66(a little dizziness reported)         Standing Orthostatic BP: 135/57     2. Diagnostic testing complete & at baseline neurologic testing: Yes    3. Cleared by consultants (if involved): No    4. Interpretation of cardiac rhythm per telemetry tech: SR HR 60-70s    5. Tolerating adequate PO diet and medications: Yes    6. Return to near baseline physical activity or neurologic status: Yes, SBA    Discharge Planner Nurse   Safe discharge environment identified: Yes  Barriers to discharge: Yes       Entered by: An Richard 01/19/2019 4:28 AM     Please review provider order for any additional goals.   Nurse to notify provider when observation goals have been met and patient is ready for discharge.    Temp: 98.7  F (37.1  C) Temp src: Oral BP: 141/59   Heart Rate: 65 Resp: 16 SpO2: 94 %, continues to deny pain  Continues to deny syncope or like symptoms. Pt states she has been sleeping/resting comfortably. No bleeding assessed, pt has not had a BM this shift and denies having to void at this time.

## 2019-01-19 NOTE — DISCHARGE SUMMARY
[facility]  Hospitalist Discharge Summary       Date of Admission:  1/17/2019  Date of Discharge:  1/19/2019  Discharging Provider: Andrae Gunter MD      Discharge Diagnoses   Syncope likely due to vasovagal response while straining. BRBPR without anemia likely due mary alice constipation and known hemorrhoids    Follow-ups Needed After Discharge   Follow-up Appointments     Follow-up and recommended labs and tests       Follow up with primary care provider, Davina rFausto, within 7 days for hospital follow- up.  No follow up labs or test are needed.               Unresulted Labs Ordered in the Past 30 Days of this Admission     No orders found from 11/18/2018 to 1/18/2019.      These results will be followed up by [facility]      Liseth Daily is a 83 year old female who was admitted on 1/17/2019.   PMH of CVA, not on any daily medications admitted after apparent near syncopal event in the restaurant restroom earlier today. No LOC.  Previous history of syncopal event secondary to vasovagal episodes.  Mostly happening in the setting of straining for bowel movement.  Emergency department stools positive for occult blood.  On the morning of January 18 she had bowel movement with red mare blood.  GI consulted.  Hemoglobin stable.  Vitals stable.  History of hemorrhoids.    1.  Near syncope while straining:  Likely vasovagal. EKG unremarkable and trops neg.  --Continue telemetry  -- Check orthostatics  --OT/PT consult      2.    Bright red blood per rectum:  All cold blood positive in emergency department.  Episode of bright red blood per rectum this morning.  Prior colonoscopies were unremarkable.  History of hemorrhoids.  Vitals are stable.  Hemoglobin appears stable  --GI consult  --Hemoglobin every 12 hours    3.  Prior history of CVA:  Plan to continue to monitor.  She was not on aspirin prior to admission.    3.  Hyperglycemia, blood sugar of 203 in metabolic panel.  No known history of  diabetes.  Plan to check A1c  Blood sugar check twice a day    Patient has not had any further BRBPR. GI has signed off. She likely had bleeding from straining/hemoorhoid. She will discharge home.      Consultations This Hospital Stay   GASTROENTEROLOGY IP CONSULT    Code Status   No Order    Time Spent on this Encounter   I, Andrae Gunter, personally saw the patient today and spent greater than 30 minutes discharging this patient.       Andrae Gunter MD  [facility]  ______________________________________________________________________    Physical Exam   Vital Signs: Temp: 97.8  F (36.6  C) Temp src: Oral BP: 123/59   Heart Rate: 66 Resp: 16 SpO2: 93 % O2 Device: None (Room air)    Weight: 144 lbs 11.2 oz  Constitutional: awake, alert, cooperative, no apparent distress, and appears stated age  Respiratory: No increased work of breathing, good air exchange, clear to auscultation bilaterally, no crackles or wheezing  Cardiovascular: Normal apical impulse, regular rate and rhythm, normal S1 and S2, no S3 or S4, and no murmur noted  GI: No scars, normal bowel sounds, soft, non-distended, non-tender, no masses palpated, no hepatosplenomegally       Primary Care Physician   Davina Frausto    Discharge Disposition   Discharged to home  Condition at discharge: Stable    Significant Results and Procedures   Most Recent 3 CBC's:  Recent Labs   Lab Test 01/18/19  1805 01/18/19  0607 01/17/19  1452   WBC  --  12.7* 8.8   HGB 12.5 12.1 13.3   MCV  --  101* 101*   PLT  --  225 316     Most Recent 3 BMP's:  Recent Labs   Lab Test 01/17/19  1452      POTASSIUM 3.4   CHLORIDE 105   CO2 27   BUN 18   CR 1.10*   ANIONGAP 9   BHUMI 9.1   *       Discharge Orders      Hemoglobin A1c    Last Lab Result: No results found for: A1C     Reason for your hospital stay    Syncope (passing out) likely due to vasovagal response while straining     Follow-up and recommended labs and tests     Follow up with primary  care provider, Davina Frausto, within 7 days for hospital follow- up.  No follow up labs or test are needed.     Activity    Your activity upon discharge: activity as tolerated     Discharge Instructions    Consider taking daily over the counter Miralax to keep your bowel movements regular and prevent constipation     Diet    Follow this diet upon discharge: Regular. Keep hydrated     Discharge Medications   Current Discharge Medication List      CONTINUE these medications which have NOT CHANGED    Details   acetaminophen (TYLENOL) 500 MG tablet Take 500 mg by mouth every 6 hours as needed for mild pain       amLODIPine (NORVASC) 5 MG tablet Take 5 mg by mouth daily      Cholecalciferol (VITAMIN D PO) Takes 4000 units by mouth every day.      losartan (COZAAR) 50 MG tablet Take 50 mg by mouth daily      miconazole (MICATIN) 2 % external cream Apply topically daily as needed      ranitidine (ZANTAC) 150 MG tablet Take 150 mg by mouth 2 times daily as needed            Allergies   Allergies   Allergen Reactions     Accupril      Aspirin      Penicillin G

## 2021-07-08 ENCOUNTER — HOSPITAL ENCOUNTER (EMERGENCY)
Facility: CLINIC | Age: 86
Discharge: HOME | End: 2021-07-08
Attending: EMERGENCY MEDICINE | Admitting: EMERGENCY MEDICINE
Payer: MEDICARE

## 2021-07-08 VITALS
RESPIRATION RATE: 16 BRPM | DIASTOLIC BLOOD PRESSURE: 90 MMHG | HEART RATE: 74 BPM | OXYGEN SATURATION: 98 % | SYSTOLIC BLOOD PRESSURE: 134 MMHG | TEMPERATURE: 97 F

## 2021-07-08 DIAGNOSIS — R55 NEAR SYNCOPE: ICD-10-CM

## 2021-07-08 LAB
ANION GAP SERPL CALCULATED.3IONS-SCNC: 31 MMOL/L (ref 3–14)
BASOPHILS # BLD AUTO: 0.1 10E9/L (ref 0–0.2)
BASOPHILS NFR BLD AUTO: 0.5 %
BUN SERPL-MCNC: 11 MG/DL (ref 7–30)
CALCIUM SERPL-MCNC: 9.7 MG/DL (ref 8.5–10.1)
CHLORIDE SERPL-SCNC: 83 MMOL/L (ref 94–109)
CO2 SERPL-SCNC: 25 MMOL/L (ref 20–32)
CREAT SERPL-MCNC: 1.01 MG/DL (ref 0.52–1.04)
DIFFERENTIAL METHOD BLD: ABNORMAL
EOSINOPHIL # BLD AUTO: 0 10E9/L (ref 0–0.7)
EOSINOPHIL NFR BLD AUTO: 0.3 %
ERYTHROCYTE [DISTWIDTH] IN BLOOD BY AUTOMATED COUNT: 13.8 % (ref 10–15)
GFR SERPL CREATININE-BSD FRML MDRD: 51 ML/MIN/{1.73_M2}
GLUCOSE SERPL-MCNC: 111 MG/DL (ref 70–99)
HCT VFR BLD AUTO: 39.7 % (ref 35–47)
HGB BLD-MCNC: 13.2 G/DL (ref 11.7–15.7)
IMM GRANULOCYTES # BLD: 0 10E9/L (ref 0–0.4)
IMM GRANULOCYTES NFR BLD: 0.3 %
LYMPHOCYTES # BLD AUTO: 1.9 10E9/L (ref 0.8–5.3)
LYMPHOCYTES NFR BLD AUTO: 20.3 %
MCH RBC QN AUTO: 33.4 PG (ref 26.5–33)
MCHC RBC AUTO-ENTMCNC: 33.2 G/DL (ref 31.5–36.5)
MCV RBC AUTO: 101 FL (ref 78–100)
MONOCYTES # BLD AUTO: 0.6 10E9/L (ref 0–1.3)
MONOCYTES NFR BLD AUTO: 6.6 %
NEUTROPHILS # BLD AUTO: 6.7 10E9/L (ref 1.6–8.3)
NEUTROPHILS NFR BLD AUTO: 72 %
NRBC # BLD AUTO: 0 10*3/UL
NRBC BLD AUTO-RTO: 0 /100
PLATELET # BLD AUTO: 352 10E9/L (ref 150–450)
POTASSIUM SERPL-SCNC: 3 MMOL/L (ref 3.4–5.3)
RBC # BLD AUTO: 3.95 10E12/L (ref 3.8–5.2)
SODIUM SERPL-SCNC: 139 MMOL/L (ref 133–144)
TROPONIN I SERPL-MCNC: <0.015 UG/L (ref 0–0.04)
WBC # BLD AUTO: 9.3 10E9/L (ref 4–11)

## 2021-07-08 PROCEDURE — 250N000013 HC RX MED GY IP 250 OP 250 PS 637: Performed by: EMERGENCY MEDICINE

## 2021-07-08 PROCEDURE — 85025 COMPLETE CBC W/AUTO DIFF WBC: CPT | Performed by: EMERGENCY MEDICINE

## 2021-07-08 PROCEDURE — 258N000003 HC RX IP 258 OP 636: Performed by: EMERGENCY MEDICINE

## 2021-07-08 PROCEDURE — 96360 HYDRATION IV INFUSION INIT: CPT

## 2021-07-08 PROCEDURE — 93005 ELECTROCARDIOGRAM TRACING: CPT

## 2021-07-08 PROCEDURE — 99284 EMERGENCY DEPT VISIT MOD MDM: CPT | Mod: 25

## 2021-07-08 PROCEDURE — 84484 ASSAY OF TROPONIN QUANT: CPT | Performed by: EMERGENCY MEDICINE

## 2021-07-08 PROCEDURE — 80048 BASIC METABOLIC PNL TOTAL CA: CPT | Performed by: EMERGENCY MEDICINE

## 2021-07-08 PROCEDURE — 96361 HYDRATE IV INFUSION ADD-ON: CPT

## 2021-07-08 RX ORDER — POTASSIUM CHLORIDE 1.5 G/1.58G
40 POWDER, FOR SOLUTION ORAL ONCE
Status: COMPLETED | OUTPATIENT
Start: 2021-07-08 | End: 2021-07-08

## 2021-07-08 RX ADMIN — POTASSIUM CHLORIDE 40 MEQ: 1.5 POWDER, FOR SOLUTION ORAL at 16:12

## 2021-07-08 RX ADMIN — SODIUM CHLORIDE 1000 ML: 9 INJECTION, SOLUTION INTRAVENOUS at 14:33

## 2021-07-08 ASSESSMENT — ENCOUNTER SYMPTOMS
BLOOD IN STOOL: 0
ABDOMINAL PAIN: 0
NAUSEA: 1
VOMITING: 0
ROS GI COMMENTS: HARD STOOL
DIZZINESS: 1
APPETITE CHANGE: 1
DIARRHEA: 0
SHORTNESS OF BREATH: 0

## 2021-07-08 NOTE — ED PROVIDER NOTES
History   Chief Complaint:  Syncope       The history is provided by the patient.      Liseth Daily is a 86 year old female with history of hyper who presents with syncope. The patient explains that she has been constipated for about a week. Earlier today, she reports that she had five, hard bowel movements. Afterwards, she became very dizzy and when she attempted to get into the car when she fell. She states she was not injured during this episode. Additionally, she has been having a decrease in appetite and does not enjoy the taste of food. She has also been having nausea but has not been able to vomit. Back in 2019, she was admitted to the hospital for three days following a similar syncopal episode. During this last episode, she had not eaten for three days prior to ED admission and had a sore abdomen. While in the ED, she denies chest pain, shortness of breath, abdominal pain, blood in the stool, or diarrhea.    Review of Systems   Constitutional: Positive for appetite change.   Respiratory: Negative for shortness of breath.    Cardiovascular: Negative for chest pain.   Gastrointestinal: Positive for nausea. Negative for abdominal pain, blood in stool, diarrhea and vomiting.        Hard stool   Neurological: Positive for dizziness and syncope.   All other systems reviewed and are negative.      Allergies:  Accupril  Aspirin  Penicillin G  Shellfish  Quinapril  Codeine     Medications:  Amlodipine  Losartan  Zantac    Past Medical History:    Cerebral infarction  GI bleed  Syncope  Hypokalemia  Hypertension  Major depressive disorder  DOMO  Osteoarthritis  Hydrocephalus    Past Surgical History:    Cholecystectomy  Left knee arthroplasty     Family History:    Father: kidney cancer, hypertension  Mother: colon cancer  Sister: Parkinson's disease, dementia, depression, paranoia, breast cancer    Social History:  The patient presents to the ED alone. She is a previous teacher. She lives with her  nephew.    Physical Exam     Patient Vitals for the past 24 hrs:   BP Temp Temp src Pulse Resp SpO2   07/08/21 1845 (!) 134/90 -- -- 74 16 98 %   07/08/21 1600 130/76 -- -- 71 18 98 %   07/08/21 1530 116/68 -- -- 70 18 95 %   07/08/21 1515 119/66 -- -- 68 18 98 %   07/08/21 1500 122/65 -- -- 69 18 100 %   07/08/21 1445 126/64 -- -- 73 -- 93 %   07/08/21 1430 132/87 -- -- 77 -- 97 %   07/08/21 1416 -- 97  F (36.1  C) Temporal -- -- --   07/08/21 1415 124/78 -- -- 78 -- 95 %   07/08/21 1414 129/70 -- -- 77 16 95 %       Physical Exam  Gen: alert  HEENT: PERRL, oropharynx clear  Neck: normal ROM  CV: RRR, no murmurs  Pulm: breath sounds equal, lungs clear  Abd: Soft, nontender  Back: no evidence of injury, no cva tenderness  MSK: no deformity, moves all extremities  Skin: no rash  Neuro: alert, appropriate conversation and interaction. Moves all extremities. No facial droop.     Emergency Department Course   ECG  ECG taken at 1429, ECG read at 1436  Accelerated junctional rhythm. Nonspecific abnormality. Abnormal ECG.   No significant changes as compared to prior, dated 1/17/2017.  Rate 77 bpm. OR interval * ms. QRS duration 68 ms. QT/QTc 424/479 ms. P-R-T axes * -1 3.       Laboratory:  CBC: WBC 9.3, HGB 13.2,    BMP: Potassium 3.0 (L), Chloride 83 (L), Glucose 111 (H), GFR estimate 51 (L) o/w WNL (Creatinine 1.01)     Troponin (Collected 1433): <0.015    Emergency Department Course:    Reviewed:  I reviewed nursing notes, vitals, past medical history and care everywhere    Assessments:  1426 I obtained history and examined the patient as noted above.     1850 I rechecked the patient and explained findings.     Interventions:  1433 NS 1L IV Bolus    1612 Potassium chloride 40 mEq PO    Disposition:  The patient was discharged to home.       Impression & Plan     Medical Decision Making:  The patient presents for an episode of near syncope. Patient has a history of near syncope in this setting of straining to  have a bowel movement. Patient reports benign quite constipated and straining to have a bowel movement right before this episode today. Here, patient states that she feels much better. She has no focal or neurologic deficit. No complain of chest pain or abdominal pain. Here, her vital signs have showed no hypotension or tachycardia or hypoxia during her stay. No signs or symptoms of PE. Patient reports poor PO intake chronically and some chronic nausea that is unchanged. Here her abdominal exam is benign. She received IV fluids with resolution of her symptoms.She had similar prior admission in 2019 which included a normal echocardiogram. Given consolation for symptoms and resolution of symptoms with IV fluids, I did not feel that she required admission. Oral potassium replacement was given. Plan for discharge home. Follow up with primary care early next week. Recommended Senna and Miralax for bowel regimen.       Covid-19  Liseth Daily was evaluated during a global COVID-19 pandemic, which necessitated consideration that the patient might be at risk for infection with the SARS-CoV-2 virus that causes COVID-19.   Applicable protocols for evaluation were followed during the patient's care.       Diagnosis:    ICD-10-CM    1. Near syncope  R55        Scribe Disclosure:  I, Shanti Shah, am serving as a scribe at 2:13 PM on 7/8/2021 to document services personally performed by Rupa Rodriguez MD based on my observations and the provider's statements to me.     [facility]         Rupa Rodriguez MD  07/09/21 0054

## 2021-07-08 NOTE — DISCHARGE INSTRUCTIONS
Push fluids.  For you constipation, take miralax 8 oz and 2 senna.  If no bowel movement in 6 hours take 8 more oz of miralax and 2 senna.  Start taking 8 oz of miralax daily to control constipation.      Discharge Instructions  Syncope    Syncope (fainting) is a sudden, short loss of consciousness (passing out spell). People will usually fall to the ground when they faint or slump over if seated.  People may also shake when this happens, and it can sometimes be difficult to tell the difference between syncope and a seizure. At this time, your provider does not find a reason to suspect that your fainting spell is a sign of anything dangerous or life-threatening.  However, sometimes the signs of serious illness do not show up right away.     Generally, every Emergency Department visit should have a follow-up clinic visit with either a primary or a specialty clinic/provider. Please follow-up as instructed by your emergency provider today.    Return to the Emergency Department if:  You faint again.   You have any significant bleeding.  You have chest pain or a fast or irregular heartbeat.  You feel short of breath.  You cough up any blood.  You have abdominal (belly) pain or unusual back pain.  You have ongoing vomiting (throwing up) or diarrhea (loose stools).  You have a black or tarry bowel movement, or blood in the stool or in your vomit.  You have a fever over 101 F.  You lose feeling or cannot move a part of your body or cannot talk normally.  You are confused, have a headache, cannot see well, or have a seizure.  DO NOT DRIVE. CALL 911 INSTEAD!    What can I do to help myself?  Follow any specific instructions that your provider discussed with you.  If you feel light-headed, make sure to sit down right away, even if you have to sit on the floor.  Follow up with your regular medical provider as discussed for further management. This may include lowering your blood pressure medications, insulin or other diabetic  medications, checking your blood sugar more frequently, and drinking more fluids, taking medicines for vomiting or diarrhea or getting up slower.  If you were given a prescription for medicine here today, be sure to read all of the information (including the package insert) that comes with your prescription.  This will include important information about the medicine, its side effects, and any warnings that you need to know about.  The pharmacist who fills the prescription can provide more information and answer questions you may have about the medicine.  If you have questions or concerns that the pharmacist cannot address, please call or return to the Emergency Department.   Remember that you can always come back to the Emergency Department if you are not able to see your regular provider in the amount of time listed above, if you get any new symptoms, or if there is anything that worries you.

## 2021-07-08 NOTE — ED TRIAGE NOTES
Pt brought in via EMS due to near syncopal episode, which occurs when pt is dehydrated per pt/family. No LOC, or fall. Pt c/o dizziness. Denies CP, EKG unremarkable. ABC's intact. VSS. 4mL zofran given for nausea. 20 LPIV.

## 2021-07-09 LAB — INTERPRETATION ECG - MUSE: NORMAL
